# Patient Record
Sex: FEMALE
[De-identification: names, ages, dates, MRNs, and addresses within clinical notes are randomized per-mention and may not be internally consistent; named-entity substitution may affect disease eponyms.]

---

## 2017-01-23 ENCOUNTER — RX ONLY (OUTPATIENT)
Age: 52
Setting detail: RX ONLY
End: 2017-01-23

## 2017-01-23 RX ORDER — TRETINOIN MICROSPHERES 0.04 %
1 GEL (GRAM) TOPICAL QHS
Qty: 1 | Refills: 3 | Status: ERX | COMMUNITY
Start: 2017-01-23

## 2017-05-15 ENCOUNTER — APPOINTMENT (RX ONLY)
Dept: URBAN - METROPOLITAN AREA CLINIC 29 | Facility: CLINIC | Age: 52
Setting detail: DERMATOLOGY
End: 2017-05-15

## 2017-05-15 DIAGNOSIS — L30.8 OTHER SPECIFIED DERMATITIS: ICD-10-CM

## 2017-05-15 DIAGNOSIS — L81.1 CHLOASMA: ICD-10-CM

## 2017-05-15 DIAGNOSIS — Z71.89 OTHER SPECIFIED COUNSELING: ICD-10-CM

## 2017-05-15 DIAGNOSIS — L81.0 POSTINFLAMMATORY HYPERPIGMENTATION: ICD-10-CM

## 2017-05-15 DIAGNOSIS — L70.0 ACNE VULGARIS: ICD-10-CM

## 2017-05-15 PROCEDURE — ? PRESCRIPTION

## 2017-05-15 PROCEDURE — ? TREATMENT REGIMEN

## 2017-05-15 PROCEDURE — ? COUNSELING

## 2017-05-15 PROCEDURE — ? SUNSCREEN RECOMMENDATIONS

## 2017-05-15 PROCEDURE — 99213 OFFICE O/P EST LOW 20 MIN: CPT

## 2017-05-15 RX ORDER — TRETINOIN MICROSPHERES 0.04 %
1 GEL (GRAM) TOPICAL AT BEDTIME
Qty: 1 | Refills: 2 | Status: ERX

## 2017-05-15 ASSESSMENT — LOCATION SIMPLE DESCRIPTION DERM
LOCATION SIMPLE: NECK
LOCATION SIMPLE: RIGHT CHEEK
LOCATION SIMPLE: CHIN
LOCATION SIMPLE: LEFT CHEEK

## 2017-05-15 ASSESSMENT — LOCATION DETAILED DESCRIPTION DERM
LOCATION DETAILED: RIGHT INFERIOR CENTRAL MALAR CHEEK
LOCATION DETAILED: LEFT CHIN
LOCATION DETAILED: LEFT INFERIOR CENTRAL MALAR CHEEK
LOCATION DETAILED: RIGHT CENTRAL LATERAL NECK
LOCATION DETAILED: RIGHT INFERIOR MEDIAL MALAR CHEEK

## 2017-05-15 ASSESSMENT — LOCATION ZONE DERM
LOCATION ZONE: NECK
LOCATION ZONE: FACE

## 2017-05-15 NOTE — PROCEDURE: TREATMENT REGIMEN
Detail Level: Zone
Plan: Recommend to apply ice or cold compress daily and can take Benadryl as needed, if not better in 10 days, patient is to follow up for further evaluation
Continue Regimen: Benev  gycolic gel in the morning and cleanser daily
Plan: Recommend to apply Retin-A 0.04% at night 3 times a week and slowly increase to every night\\nIf patient is getting too dry with this regimen she can d/c benev  glycolic acid and continue using retin -a
Plan: Patient instructed to stop triluma until acne has improved, continue sunscreen daily and reapplying